# Patient Record
(demographics unavailable — no encounter records)

---

## 2025-07-30 NOTE — DISCUSSION/SUMMARY
[FreeTextEntry1] : ECG normal today. BP normal today. Echocardiogram done 7/2024, showed normal LV EF, normal LV diastolic function, no significant valvular disease and normal estimated PASP.  Patient currently asymptomatic from a cardiovascular standpoint.   No further cardiac testing or follow up indicated.  Follow up on PRN basis. [EKG obtained to assist in diagnosis and management of assessed problem(s)] : EKG obtained to assist in diagnosis and management of assessed problem(s)

## 2025-07-30 NOTE — HISTORY OF PRESENT ILLNESS
[FreeTextEntry1] : Historical Perspective: 27 year old male with no significant PMHx presents for a cardiac evaluation. Patient had abnormal ECG and was told to see cardiology. Patient exercises by lifting weights. He drinks energy drinks and utilizes pre-workout supplement. Patient has no chest pain, dyspnea or palpitations.  There is no history of MI, CVA, CHF, or previous coronary intervention.  Both parents with HTN.  Current Health Status: Patient with no chest pain, SOB, or palpitations. No hospitalizations since seeing me last. Remains compliant with medications and reports no adverse effects.

## 2025-07-30 NOTE — CARDIOLOGY SUMMARY
[de-identified] : 7/30/2025, NSR, normal ECG 6/5/2024, NSR with increased voltages and non-specific ST changes [de-identified] : 7/22/2024, LV EF 55-60%, normal LV diastolic function, mild MR, trace-mild TR with estimated PASP of 19mmHg.